# Patient Record
Sex: FEMALE | Race: WHITE | NOT HISPANIC OR LATINO | URBAN - METROPOLITAN AREA
[De-identification: names, ages, dates, MRNs, and addresses within clinical notes are randomized per-mention and may not be internally consistent; named-entity substitution may affect disease eponyms.]

---

## 2022-01-03 ENCOUNTER — TELEPHONE (OUTPATIENT)
Dept: GASTROENTEROLOGY | Facility: CLINIC | Age: 37
End: 2022-01-03

## 2022-01-04 DIAGNOSIS — K59.00 CONSTIPATION, UNSPECIFIED CONSTIPATION TYPE: ICD-10-CM

## 2022-01-04 DIAGNOSIS — K59.04 CHRONIC IDIOPATHIC CONSTIPATION: Primary | ICD-10-CM

## 2022-01-04 DIAGNOSIS — K21.9 GASTROESOPHAGEAL REFLUX DISEASE, UNSPECIFIED WHETHER ESOPHAGITIS PRESENT: ICD-10-CM

## 2022-01-04 NOTE — TELEPHONE ENCOUNTER
----- Message from Priscila Mendez MA sent at 1/3/2022  3:51 PM CST -----  Regarding: Rx  Her Amitiza 24mcg was denied by insurance. She must try and fail 3 formulary alternatives: Linzess, Trulicity, Motegrity.  KDL, CMA

## 2022-01-04 NOTE — TELEPHONE ENCOUNTER
I discussed Dr. Valdez's recommendations with the patient who gives okay to send eRx for trulance and will let me know outcome.   KDL, CMA

## 2022-01-06 RX ORDER — PLECANATIDE 3 MG/1
3 TABLET ORAL ONCE
Qty: 90 TABLET | Refills: 1 | Status: CANCELLED | OUTPATIENT
Start: 2022-01-06 | End: 2022-01-06

## 2022-01-10 DIAGNOSIS — K59.04 CHRONIC IDIOPATHIC CONSTIPATION: Primary | ICD-10-CM

## 2022-01-10 NOTE — TELEPHONE ENCOUNTER
I LVM for the patient to call me back regarding her Rx. Looks like I did not originally send to Dr. Valdez correctly.   KDL, CMA

## 2022-01-10 NOTE — TELEPHONE ENCOUNTER
----- Message from Lexus Badillo sent at 1/7/2022  3:29 PM CST -----  Regarding: pt  Pt would like to speak to the nurse in regards to her prescription. Please call back at [phone]

## 2022-01-10 NOTE — TELEPHONE ENCOUNTER
Pt called asking for you to call back that the meds are still very expensive and that it is the same thing that was already sent out previously. -KG

## 2022-01-11 RX ORDER — PLECANATIDE 3 MG/1
3 TABLET ORAL DAILY
Qty: 90 TABLET | Refills: 1 | Status: SHIPPED | OUTPATIENT
Start: 2022-01-11 | End: 2023-02-22 | Stop reason: SDUPTHER

## 2022-01-11 RX ORDER — PLECANATIDE 3 MG/1
3 TABLET ORAL ONCE
Qty: 90 TABLET | Refills: 1 | OUTPATIENT
Start: 2022-01-11 | End: 2022-01-11

## 2022-01-11 NOTE — TELEPHONE ENCOUNTER
I spoke with the patient who states that she HAS NOT received a prescription for Trulance. She keeps getting calls for amitiza and this is too expensive. She will come and  samples on Thursday, but would also like to get the process started with her prescription.  ASHUL, CMA

## 2022-01-11 NOTE — TELEPHONE ENCOUNTER
Can she get two boxes of samples to try at home and if does not help well then we can submit request for Amitiza appeal? If the case, then snooze for update in 2 weeks.  VIOLETA

## 2022-01-12 NOTE — TELEPHONE ENCOUNTER
I am not sure why the [facility] does not have the first Trulance eRx but I just sent a new one. Let me know if any issues.  NBP

## 2022-09-07 ENCOUNTER — TELEPHONE (OUTPATIENT)
Dept: GASTROENTEROLOGY | Facility: CLINIC | Age: 37
End: 2022-09-07

## 2022-09-07 NOTE — TELEPHONE ENCOUNTER
----- Message from Judit Padron sent at 9/6/2022 12:28 PM CDT -----  Regarding: Sooner Appointment  Contact: patient  Type:  Sooner Apoointment Request    Caller is requesting a sooner appointment.  Caller declined first available appointment listed below.  Caller will not accept being placed on the waitlist and is requesting a message be sent to doctor.  Name of Caller:Leesa  When is the first available appointment? 03/2023  Symptoms: stomach issues   Would the patient rather a call back or a response via [facility]?  Call back   Best Call Back Number: [phone] (home)     Additional Information:     Yenni,  CATARINA

## 2023-02-22 NOTE — TELEPHONE ENCOUNTER
----- Message from Judit Vito sent at 2/22/2023  3:45 PM CST -----  Regarding: Refill  Contact: patient  Type:  RX Refill Request    Who Called: Leesa Melgozaill or New Rx:new refill  RX Name and Strength: Trulance 3 mg  How is the patient currently taking it? (ex. 1XDay):daily   Is this a 30 day or 90 day RX: 30  Preferred Pharmacy with phone number:  [facility], [address] [address]  Phone: [phone] Fax: [phone]      Local or Mail Order:local  Ordering Provider: Dr Valdez   Would the patient rather a call back or a response via [facility]?  Call back   Best Call Back Number: [phone] (home)     Additional Information:     Yenni,  CATARINA

## 2023-02-23 RX ORDER — PLECANATIDE 3 MG/1
3 TABLET ORAL DAILY
Qty: 90 TABLET | Refills: 1 | Status: SHIPPED | OUTPATIENT
Start: 2023-02-23 | End: 2023-06-21 | Stop reason: SDUPTHER

## 2023-03-20 NOTE — TELEPHONE ENCOUNTER
Patient needs a next available follow up OV with me as a back up. Notify me sooner if any issues.  NBP

## 2023-03-20 NOTE — TELEPHONE ENCOUNTER
Next available OV scheduled for 10/11/23. Pt told Zohreh, our PAR that she would like a sooner appt. I attempted to speak to pt to get more information from her. No answer. LVM to call our office.

## 2023-06-21 RX ORDER — PLECANATIDE 3 MG/1
3 TABLET ORAL DAILY
Qty: 90 TABLET | Refills: 1 | Status: SHIPPED | OUTPATIENT
Start: 2023-06-21

## 2023-06-21 NOTE — TELEPHONE ENCOUNTER
S/w and she asked if she could get a refill on her Trulance 3 mg. She has an OV in place. Her current symptoms are IBS and constipation. theodora

## 2023-10-12 ENCOUNTER — OFFICE VISIT (OUTPATIENT)
Dept: GASTROENTEROLOGY | Facility: CLINIC | Age: 38
End: 2023-10-12
Payer: COMMERCIAL

## 2023-10-12 VITALS
WEIGHT: 143.19 LBS | HEIGHT: 66 IN | BODY MASS INDEX: 23.01 KG/M2 | HEART RATE: 76 BPM | OXYGEN SATURATION: 96 % | DIASTOLIC BLOOD PRESSURE: 74 MMHG | SYSTOLIC BLOOD PRESSURE: 115 MMHG

## 2023-10-12 DIAGNOSIS — K59.04 CHRONIC IDIOPATHIC CONSTIPATION: Primary | ICD-10-CM

## 2023-10-12 DIAGNOSIS — K58.1 IRRITABLE BOWEL SYNDROME WITH CONSTIPATION: ICD-10-CM

## 2023-10-12 PROCEDURE — 1160F RVW MEDS BY RX/DR IN RCRD: CPT | Mod: CPTII,S$GLB,, | Performed by: INTERNAL MEDICINE

## 2023-10-12 PROCEDURE — 99213 PR OFFICE/OUTPT VISIT, EST, LEVL III, 20-29 MIN: ICD-10-PCS | Mod: S$GLB,,, | Performed by: INTERNAL MEDICINE

## 2023-10-12 PROCEDURE — 3074F PR MOST RECENT SYSTOLIC BLOOD PRESSURE < 130 MM HG: ICD-10-PCS | Mod: CPTII,S$GLB,, | Performed by: INTERNAL MEDICINE

## 2023-10-12 PROCEDURE — 3008F PR BODY MASS INDEX (BMI) DOCUMENTED: ICD-10-PCS | Mod: CPTII,S$GLB,, | Performed by: INTERNAL MEDICINE

## 2023-10-12 PROCEDURE — 1159F PR MEDICATION LIST DOCUMENTED IN MEDICAL RECORD: ICD-10-PCS | Mod: CPTII,S$GLB,, | Performed by: INTERNAL MEDICINE

## 2023-10-12 PROCEDURE — 3078F PR MOST RECENT DIASTOLIC BLOOD PRESSURE < 80 MM HG: ICD-10-PCS | Mod: CPTII,S$GLB,, | Performed by: INTERNAL MEDICINE

## 2023-10-12 PROCEDURE — 3078F DIAST BP <80 MM HG: CPT | Mod: CPTII,S$GLB,, | Performed by: INTERNAL MEDICINE

## 2023-10-12 PROCEDURE — 99213 OFFICE O/P EST LOW 20 MIN: CPT | Mod: S$GLB,,, | Performed by: INTERNAL MEDICINE

## 2023-10-12 PROCEDURE — 1159F MED LIST DOCD IN RCRD: CPT | Mod: CPTII,S$GLB,, | Performed by: INTERNAL MEDICINE

## 2023-10-12 PROCEDURE — 3074F SYST BP LT 130 MM HG: CPT | Mod: CPTII,S$GLB,, | Performed by: INTERNAL MEDICINE

## 2023-10-12 PROCEDURE — 1160F PR REVIEW ALL MEDS BY PRESCRIBER/CLIN PHARMACIST DOCUMENTED: ICD-10-PCS | Mod: CPTII,S$GLB,, | Performed by: INTERNAL MEDICINE

## 2023-10-12 PROCEDURE — 3008F BODY MASS INDEX DOCD: CPT | Mod: CPTII,S$GLB,, | Performed by: INTERNAL MEDICINE

## 2023-10-12 RX ORDER — OMEPRAZOLE 40 MG/1
40 CAPSULE, DELAYED RELEASE ORAL
COMMUNITY
Start: 2023-09-20

## 2023-10-12 RX ORDER — IBUPROFEN 800 MG/1
TABLET ORAL
COMMUNITY
Start: 2023-09-22

## 2023-10-12 RX ORDER — LISDEXAMFETAMINE DIMESYLATE 70 MG/1
CAPSULE ORAL
COMMUNITY
Start: 2023-10-10

## 2023-10-12 RX ORDER — TRAZODONE HYDROCHLORIDE 150 MG/1
TABLET ORAL
COMMUNITY

## 2023-10-12 RX ORDER — MONTELUKAST SODIUM 10 MG/1
TABLET ORAL
COMMUNITY
Start: 2023-09-20

## 2023-10-12 RX ORDER — MECLIZINE HYDROCHLORIDE 25 MG/1
TABLET ORAL
COMMUNITY

## 2023-10-12 RX ORDER — CLONIDINE HYDROCHLORIDE 0.2 MG/1
TABLET ORAL
COMMUNITY
Start: 2023-09-22

## 2023-10-12 RX ORDER — UPADACITINIB 15 MG/1
TABLET, EXTENDED RELEASE ORAL
COMMUNITY

## 2023-10-12 RX ORDER — LINACLOTIDE 72 UG/1
CAPSULE, GELATIN COATED ORAL
COMMUNITY
Start: 2023-08-18 | End: 2023-10-12

## 2023-10-12 NOTE — PROGRESS NOTES
"Clinic Note    Reason for visit:  The primary encounter diagnosis was Chronic idiopathic constipation. A diagnosis of Irritable bowel syndrome with constipation was also pertinent to this visit.    PCP: Tone Starks       HPI:  This is a 38 y.o. female who was last seen in 2021. She has constipation and is taking Trulance 3 mg and Linzess 72 mcg daily. If taking both, she has BM almost daily. If she takes Trulance alone, it does not work. She states the higher dose of Linzess does work for her. She also takes MiraLAX daily.     Day 5 Sitzmarker: abundant stool in colon, 22 markers in TC/left/pelvis.    Colonoscopy 1/2021: "localized abnormal mucosa with no bleeding" in RSJ, Bx nl    Review of Systems   Constitutional:  Negative for fatigue, fever and unexpected weight change.   HENT:  Negative for mouth sores, postnasal drip, sore throat and trouble swallowing.    Eyes:  Negative for pain, discharge and eye dryness.   Respiratory:  Negative for apnea, cough, choking, chest tightness, shortness of breath and wheezing.    Cardiovascular:  Negative for chest pain, palpitations and leg swelling.   Gastrointestinal:  Positive for abdominal pain and constipation. Negative for abdominal distention, anal bleeding, blood in stool, change in bowel habit, diarrhea, nausea, rectal pain, vomiting, reflux and fecal incontinence.   Genitourinary:  Negative for bladder incontinence, dysuria and hematuria.   Musculoskeletal:  Negative for arthralgias, back pain and joint swelling.   Integumentary:  Negative for color change and rash.   Allergic/Immunologic: Negative for environmental allergies and food allergies.   Neurological:  Negative for seizures and headaches.   Hematological:  Negative for adenopathy. Does not bruise/bleed easily.        Past Medical History:   Diagnosis Date    ADHD     Constipation     Eczema     IBS (irritable bowel syndrome)      Past Surgical History:   Procedure Laterality Date    APPENDECTOMY      " "breast augmentation Bilateral     CHOLECYSTECTOMY      COLONOSCOPY      Gastric sleeve      HERNIA REPAIR      HYSTERECTOMY      LAPAROSCOPIC GASTRIC BANDING      LAPAROSCOPIC REMOVAL OF GASTRIC BAND      NASAL SEPTOPLASTY      Tonsilectomy      Tummy tuck       Family History   Problem Relation Age of Onset    Alzheimer's disease Mother     Heart disease Maternal Uncle     Breast cancer Maternal Grandmother      Social History     Tobacco Use    Smoking status: Never    Smokeless tobacco: Never   Substance Use Topics    Alcohol use: Yes     Comment: social    Drug use: Never     Review of patient's allergies indicates:  No Known Allergies     Medication List with Changes/Refills   New Medications    LINACLOTIDE (LINZESS) 145 MCG CAP CAPSULE    Take 1 capsule (145 mcg total) by mouth before breakfast.   Current Medications    CLONIDINE (CATAPRES) 0.2 MG TABLET        IBUPROFEN (ADVIL,MOTRIN) 800 MG TABLET        LISDEXAMFETAMINE (VYVANSE) 70 MG CAPSULE        MECLIZINE (ANTIVERT) 25 MG TABLET        MONTELUKAST (SINGULAIR) 10 MG TABLET        OMEPRAZOLE (PRILOSEC) 40 MG CAPSULE    Take 40 mg by mouth.    PLECANATIDE (TRULANCE) 3 MG TAB    Take 3 mg by mouth once daily.    TRAZODONE (DESYREL) 150 MG TABLET        UPADACITINIB (RINVOQ) 15 MG 24 HR TABLET       Discontinued Medications    LINZESS 72 MCG CAP CAPSULE             Vital Signs:  /74   Pulse 76   Ht 5' 6" (1.676 m)   Wt 65 kg (143 lb 3.2 oz)   SpO2 96%   BMI 23.11 kg/m²         Physical Exam  Vitals reviewed.   Constitutional:       General: She is awake. She is not in acute distress.     Appearance: Normal appearance. She is well-developed. She is not ill-appearing, toxic-appearing or diaphoretic.   HENT:      Head: Normocephalic and atraumatic.      Nose: Nose normal.      Mouth/Throat:      Mouth: Mucous membranes are moist.      Pharynx: Oropharynx is clear. No oropharyngeal exudate or posterior oropharyngeal erythema.   Eyes:      General: " Lids are normal. Gaze aligned appropriately. No scleral icterus.        Right eye: No discharge.         Left eye: No discharge.      Conjunctiva/sclera: Conjunctivae normal.   Neck:      Trachea: Trachea normal.   Cardiovascular:      Rate and Rhythm: Normal rate and regular rhythm.      Pulses:           Radial pulses are 2+ on the right side and 2+ on the left side.   Pulmonary:      Effort: Pulmonary effort is normal. No respiratory distress.      Breath sounds: No stridor. No wheezing.   Chest:      Chest wall: No tenderness.   Abdominal:      General: Bowel sounds are normal. There is no distension.      Palpations: Abdomen is soft. There is no fluid wave, hepatomegaly or mass.      Tenderness: There is no abdominal tenderness. There is no guarding or rebound.   Musculoskeletal:         General: No tenderness or deformity.      Cervical back: Full passive range of motion without pain and neck supple. No tenderness.      Right lower leg: No edema.      Left lower leg: No edema.   Lymphadenopathy:      Cervical: No cervical adenopathy.   Skin:     General: Skin is warm and dry.      Capillary Refill: Capillary refill takes less than 2 seconds.      Coloration: Skin is not cyanotic, jaundiced or pale.   Neurological:      General: No focal deficit present.      Mental Status: She is alert and oriented to person, place, and time.      Motor: No tremor.   Psychiatric:         Attention and Perception: Attention normal.         Mood and Affect: Mood and affect normal.         Speech: Speech normal.         Behavior: Behavior normal. Behavior is cooperative.            All of the data above and below has been reviewed by myself and any further interpretations will be reflected in the assessment and plan.   The data includes review of external notes, and independent interpretation of lab results, procedures, x-rays, and imaging reports.      Assessment:  Chronic idiopathic constipation  -     linaCLOtide (LINZESS) 145  mcg Cap capsule; Take 1 capsule (145 mcg total) by mouth before breakfast.  Dispense: 90 capsule; Refill: 4    Irritable bowel syndrome with constipation    Constipation controlled with Linzess 72mcg+Trulance daily. Trial of Linzess 145 mcg daily and stopping Trulance. Will give samples of 290 mcg as well. Also discussed Ibsrela if Linzess does not help or causes unpredictable BMs.     Recommendations:  Begin taking Linzess 145 mcg daily and stop Trulance. Let my office know if this works for you.     Risks, benefits, and alternatives of medical management, any associated procedures, and/or treatment discussed with the patient. Patient given opportunity to ask questions and voices understanding. Patient has elected to proceed with the recommended care modalities as discussed.    Instructed patient to notify my office if they have not been contacted within two weeks after any procedures, submitting any samples (biopsies, blood, stool, urine, etc.) or after any imaging (X-ray, CT, MRI, etc.).     Follow up in about 1 year (around 10/12/2024).    Order summary:  No orders of the defined types were placed in this encounter.     This assessment, plan, and documentation was performed in collaboration with Ramya Chris NP.      This document may have been created using a voice recognition transcribing system. Incorrect words or phrases may have been missed during proofreading. Please interpret accordingly or contact me for clarification.     Rosy Valdez MD

## 2023-10-12 NOTE — LETTER
October 12, 2023        Tone Starks MD  [address]  [place] [address]             [facility]  [address] [place] [address]  Phone: [phone]  Fax: [phone]   Patient: Leesa Hines   MR Number: [id number]   YOB: 1985   Date of Visit: 10/12/2023       Dear Dr. Starks:    Thank you for referring Leesa Hines to me for evaluation. Attached you will find relevant portions of my assessment and plan of care.    If you have questions, please do not hesitate to call me. I look forward to following Leesa Hines along with you.    Sincerely,      Rosy Valdez MD            CC  No Recipients    Enclosure

## 2024-10-02 ENCOUNTER — TELEPHONE (OUTPATIENT)
Dept: GASTROENTEROLOGY | Facility: CLINIC | Age: 39
End: 2024-10-02
Payer: COMMERCIAL

## 2024-10-02 NOTE — TELEPHONE ENCOUNTER
----- Message from Jeni sent at 9/30/2024  9:23 AM CDT -----  States she needs to get her Paper from Dr Valdez, so she can schedule her surgery. States Dr Alfaro at [facility] has sent three faxes since 9/20. Please call pt [phone]. Thank you

## 2024-10-02 NOTE — TELEPHONE ENCOUNTER
Called patient and she advised our office is very unorganized and it's ridiculous that her  had to come up here and get whatever she needed. Explained to patient that we are severely short staffed but I understood. Patient said this is why she left our office. Patient hung up. DMP